# Patient Record
Sex: MALE | URBAN - METROPOLITAN AREA
[De-identification: names, ages, dates, MRNs, and addresses within clinical notes are randomized per-mention and may not be internally consistent; named-entity substitution may affect disease eponyms.]

---

## 2023-05-01 ENCOUNTER — APPOINTMENT (RX ONLY)
Dept: URBAN - METROPOLITAN AREA CLINIC 148 | Facility: CLINIC | Age: 28
Setting detail: DERMATOLOGY
End: 2023-05-01

## 2023-05-01 PROBLEM — D23.9 OTHER BENIGN NEOPLASM OF SKIN, UNSPECIFIED: Status: ACTIVE | Noted: 2023-05-01

## 2023-05-01 PROCEDURE — ? PREVENTATIVE SKIN CANCER SCREENING

## 2023-05-01 NOTE — PROCEDURE: PREVENTATIVE SKIN CANCER SCREENING
Billing Warning: If you want to bill the  [id number] cpt codes you must manually override the bill. Billing Warning: If you want to bill the  [id number] cpt codes you must manually override the bill.